# Patient Record
Sex: MALE | Race: WHITE | Employment: STUDENT | URBAN - METROPOLITAN AREA
[De-identification: names, ages, dates, MRNs, and addresses within clinical notes are randomized per-mention and may not be internally consistent; named-entity substitution may affect disease eponyms.]

---
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
## 2019-05-20 ENCOUNTER — OFFICE VISIT (OUTPATIENT)
Dept: FAMILY MEDICINE CLINIC | Facility: CLINIC | Age: 19
End: 2019-05-20
Payer: COMMERCIAL

## 2019-05-20 VITALS
BODY MASS INDEX: 26.16 KG/M2 | HEIGHT: 74.5 IN | SYSTOLIC BLOOD PRESSURE: 100 MMHG | OXYGEN SATURATION: 98 % | WEIGHT: 206 LBS | RESPIRATION RATE: 18 BRPM | HEART RATE: 50 BPM | DIASTOLIC BLOOD PRESSURE: 62 MMHG | TEMPERATURE: 98 F

## 2019-05-20 DIAGNOSIS — Z00.00 ROUTINE GENERAL MEDICAL EXAMINATION AT A HEALTH CARE FACILITY: Primary | ICD-10-CM

## 2019-05-20 PROCEDURE — 99395 PREV VISIT EST AGE 18-39: CPT | Performed by: FAMILY MEDICINE

## 2019-05-20 NOTE — PROGRESS NOTES
See scanned form. No complaints. Scar left wrist:  Giant hairy nevus removal as infant. No hernias, no scoliosis, no murmur. Saloem Berkowitz.

## 2019-06-25 DIAGNOSIS — L70.0 ACNE VULGARIS: ICD-10-CM

## 2019-06-26 RX ORDER — TRETINOIN 0.025 %
CREAM (GRAM) TOPICAL
Qty: 20 G | Refills: 1 | Status: SHIPPED | OUTPATIENT
Start: 2019-06-26 | End: 2020-11-24

## 2019-11-10 DIAGNOSIS — L70.0 ACNE VULGARIS: ICD-10-CM

## 2019-11-11 RX ORDER — MINOCYCLINE HYDROCHLORIDE 100 MG/1
100 CAPSULE ORAL 2 TIMES DAILY
Qty: 180 CAPSULE | Refills: 3 | Status: SHIPPED | OUTPATIENT
Start: 2019-11-11 | End: 2020-11-24

## 2020-06-23 ENCOUNTER — TELEPHONE (OUTPATIENT)
Dept: FAMILY MEDICINE CLINIC | Facility: CLINIC | Age: 20
End: 2020-06-23

## 2020-06-23 NOTE — TELEPHONE ENCOUNTER
Pt dropped of form yesterday at 2:30 and says someone called him regarding it but nothing is documented, please call pt

## 2020-06-26 ENCOUNTER — OFFICE VISIT (OUTPATIENT)
Dept: FAMILY MEDICINE CLINIC | Facility: CLINIC | Age: 20
End: 2020-06-26
Payer: COMMERCIAL

## 2020-06-26 ENCOUNTER — APPOINTMENT (OUTPATIENT)
Dept: LAB | Age: 20
End: 2020-06-26
Attending: PHYSICIAN ASSISTANT
Payer: COMMERCIAL

## 2020-06-26 VITALS
RESPIRATION RATE: 18 BRPM | DIASTOLIC BLOOD PRESSURE: 72 MMHG | HEIGHT: 75 IN | HEART RATE: 55 BPM | BODY MASS INDEX: 25.49 KG/M2 | OXYGEN SATURATION: 99 % | WEIGHT: 205 LBS | SYSTOLIC BLOOD PRESSURE: 128 MMHG

## 2020-06-26 DIAGNOSIS — Z11.1 SCREENING-PULMONARY TB: ICD-10-CM

## 2020-06-26 DIAGNOSIS — Z00.00 ROUTINE GENERAL MEDICAL EXAMINATION AT A HEALTH CARE FACILITY: Primary | ICD-10-CM

## 2020-06-26 PROCEDURE — 86480 TB TEST CELL IMMUN MEASURE: CPT | Performed by: PHYSICIAN ASSISTANT

## 2020-06-26 PROCEDURE — 99395 PREV VISIT EST AGE 18-39: CPT | Performed by: PHYSICIAN ASSISTANT

## 2020-06-26 PROCEDURE — 36415 COLL VENOUS BLD VENIPUNCTURE: CPT | Performed by: PHYSICIAN ASSISTANT

## 2020-06-26 NOTE — PROGRESS NOTES
HPI:    Patient ID: Saranya Arguello is a 20year old male. HPI   Patient presents today requesting physical exam. Overall feeling well. Needs TB test for college, going to [address].      Diet: well balanced  Exercise: exercise 6 days/week  Tob appears well-developed and well-nourished. No distress. HENT:   Head: Normocephalic and atraumatic.    Right Ear: Tympanic membrane and external ear normal.   Left Ear: Tympanic membrane and external ear normal.   Nose: Nose normal.   Mouth/Throat: [place]

## 2020-11-24 DIAGNOSIS — L70.0 ACNE VULGARIS: ICD-10-CM

## 2020-11-24 RX ORDER — MINOCYCLINE HYDROCHLORIDE 100 MG/1
100 CAPSULE ORAL 2 TIMES DAILY
Qty: 180 CAPSULE | Refills: 3 | Status: SHIPPED | OUTPATIENT
Start: 2020-11-24

## 2020-11-24 RX ORDER — TRETINOIN 0.025 %
1 CREAM (GRAM) TOPICAL NIGHTLY
Qty: 20 G | Refills: 1 | Status: SHIPPED | OUTPATIENT
Start: 2020-11-24 | End: 2021-10-25

## 2021-10-25 DIAGNOSIS — L70.0 ACNE VULGARIS: ICD-10-CM

## 2021-10-25 RX ORDER — TRETINOIN 0.025 %
CREAM (GRAM) TOPICAL
Qty: 20 G | Refills: 1 | Status: SHIPPED | OUTPATIENT
Start: 2021-10-25
